# Patient Record
Sex: FEMALE | Race: ASIAN | Employment: UNEMPLOYED | URBAN - METROPOLITAN AREA
[De-identification: names, ages, dates, MRNs, and addresses within clinical notes are randomized per-mention and may not be internally consistent; named-entity substitution may affect disease eponyms.]

---

## 2017-11-29 ENCOUNTER — HOSPITAL ENCOUNTER (OUTPATIENT)
Age: 32
Discharge: HOME | End: 2017-11-29
Payer: MEDICAID

## 2017-11-29 VITALS
OXYGEN SATURATION: 98 % | HEIGHT: 62 IN | SYSTOLIC BLOOD PRESSURE: 126 MMHG | BODY MASS INDEX: 20.24 KG/M2 | WEIGHT: 110 LBS | RESPIRATION RATE: 20 BRPM | DIASTOLIC BLOOD PRESSURE: 85 MMHG | TEMPERATURE: 98 F | HEART RATE: 101 BPM

## 2017-11-29 DIAGNOSIS — L85.3 XEROSIS CUTIS: ICD-10-CM

## 2017-11-29 DIAGNOSIS — L24.89 IRRITANT CONTACT DERMATITIS DUE TO OTHER AGENTS: Primary | ICD-10-CM

## 2017-11-29 PROCEDURE — 99204 OFFICE O/P NEW MOD 45 MIN: CPT

## 2017-11-29 PROCEDURE — 99203 OFFICE O/P NEW LOW 30 MIN: CPT

## 2017-11-29 RX ORDER — LORAZEPAM 1 MG/1
1 TABLET ORAL EVERY 4 HOURS PRN
COMMUNITY
End: 2018-02-12

## 2017-11-29 RX ORDER — HYDROXYZINE HYDROCHLORIDE 25 MG/1
25 TABLET, FILM COATED ORAL 3 TIMES DAILY PRN
COMMUNITY
End: 2018-02-12

## 2017-11-29 NOTE — ED INITIAL ASSESSMENT (HPI)
Rash to groin area that started 1-2 days ago. Had some rash last year. Pt recently wore new underwear without washing first, so she thinks it may be from that.

## 2017-11-29 NOTE — ED PROVIDER NOTES
Patient Seen in: [facility]    History   Patient presents with:  Rash Skin Problem (integumentary)    Stated Complaint: Rash    HPI    Patient is a 49-year-old female who arrives for evaluation of 2 complaints.   Patient recently with some skin cracking of the knuckles. There is a papular dry irritation to the bilateral inguinal region. It is well demarcated and triangular shapes.   Neuro: Cranial nerves intact, Normal Gait    ED Course   Labs Reviewed - No data to display    ED C

## 2017-11-29 NOTE — ED NOTES
Pt used mometasone furoate cream 0.1 last time she had this irritant rash and it cleared it up. Pt asking for same prescription refill for the rash.

## 2018-01-24 NOTE — BH LEVEL OF CARE ASSESSMENT
Level of Care Assessment Note    General Questions  Why are you here?: PT IS A PLEASANT 27 YR OLD FEMALE WHO ARIVES TO THE ER W/ HER  W/ REQUEST FOR RX FOR Gibson Reza.   ER MD PLACED PT ON SECLUSION & ORDERED A MENTAL HEALTH EVAL AFTER LEARNING FROM [facility] EPISODES OF SCREAMING & YELLING. OFREN SLEEPS ALL DAY & DOESN'T WANT TO GET UP.    CANNOT DE-ESCALATE HER. CAN BE OK X A FEW DAYS & THEN BECOME RAGEFUL AGAIN.    RUMINATES ABOUT THINGS & YELLS ABOUT THINGS THAT HAPPENED IN THE PAST & MIGHT HAPPEN I observed  Depression Symptoms: Appetite change; Change in energy level;Feelings of helplessness; Increased irritability; Loss of interest;Sleep disturbance;Isolative  Anxiety Symptoms: Generalized;Panic attack  Trauma Reaction:  (NEEDS FURTHER ASSESSMENT)  Bi for recovery?: Yes     Withdrawal Symptoms  Current Withdrawal Symptoms: No  Breathalyzer: 0 (BAL = < 3  (NONE DETETCTED) )    Compulsive Behaviors  Are you/others concerned about any of the following behaviors over the past 30 days?: Denies THIS TIME, PT AGAIN BECAME UPSET & TRIED TO LEAVE. PT WAS GIVEN HALDOL & ATIVAN. UPON RETURNING TO PT'S EXAM ROOM, PT APOLOGIZED FOR HER BEHAVIOR & WAS THEN QUITE CALM & COOPERATIVE. PT DENIES ANY CURRENT OR RECENT THOUGHT OF HARMING SELF OR OTHERS.   ESTRADA letter given  Paperwork Signed: Patient Rights;Agreement and Authorization; Insurance BAKARI; Family BAKARI;Psychiatrist BAKARI  Expected Discharge Date: 02/07/18  Patient Provided: Rights of Individuals Receiving MH/DD Services  Patient Verbalized Understanding: Yes

## 2018-01-24 NOTE — ED NOTES
Patient yelling out stating she wants to leave and being argumentative with .  asked to step out to alleviate stressful situation for patient. Patient informed that she will be evaluated by [facility] shortly.

## 2018-01-24 NOTE — ED PROVIDER NOTES
Patient Seen in: [facility] Emergency Department    History   Patient presents with:  Jhonny-P (psychiatric): bipolar off meds    Stated Complaint: jhonny mcintosh    HPI  25-year-old female with a history of bipolar disorder who has been noncompliant with her thyromegaly. Lungs are clear to auscultation. Heart exam: Normal S1-S2 without extra sounds or murmurs. Regular rate and rhythm. Chest and abdomen are nontender. Extremities are atraumatic. Skin is dry without rashes or lesions.   Neuro exam: Alert an remained intermittently agitated and did require chemical sedation with Haldol and Ativan. Crisis worker evaluation was obtained.   Because the patient states that she does not want hospitalization and is not suicidal or homicidal, she did not meet crite

## 2018-01-24 NOTE — ED NOTES
Patient given referrals by Elias Mcdonald [facility] staff, for out patient treatment.
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
## 2018-01-24 NOTE — ED NOTES
Patient yelling at  stating \"I don't have the problem, you're the problem\".  asked to step out of the room at this time. RN asked patient if her  is hurting her or is he makes her feel unsafe. Patient states \"No\".

## 2018-02-13 PROBLEM — F41.1 ANXIETY STATE: Status: ACTIVE | Noted: 2018-02-13

## 2018-02-13 PROBLEM — F31.81 BIPOLAR 2 DISORDER (HCC): Status: ACTIVE | Noted: 2018-02-13

## 2018-02-13 PROBLEM — F41.0 PANIC ATTACKS: Status: ACTIVE | Noted: 2018-02-13

## 2018-02-13 PROBLEM — F12.20 CANNABIS DEPENDENCE (HCC): Status: ACTIVE | Noted: 2018-02-13

## (undated) NOTE — ED AVS SNAPSHOT
Lisa Anaya   MRN: [id number]    Department:  [facility] Emergency Department   Date of Visit:  1/24/2018           Disclosure     Insurance plans vary and the physician(s) referred by the ER may not be covered by your plan.  Please contact your i tell this physician (or your personal doctor if your instructions are to return to your personal doctor) about any new or lasting problems. The primary care or specialist physician will see patients referred from the [facility] Emergency Department.  Marilee Curtis